# Patient Record
Sex: FEMALE | Race: WHITE
[De-identification: names, ages, dates, MRNs, and addresses within clinical notes are randomized per-mention and may not be internally consistent; named-entity substitution may affect disease eponyms.]

---

## 2018-12-05 ENCOUNTER — HOSPITAL ENCOUNTER (OUTPATIENT)
Dept: HOSPITAL 47 - RADPROMAIN | Age: 63
Discharge: HOME | End: 2018-12-05
Payer: MEDICARE

## 2018-12-05 VITALS — HEART RATE: 77 BPM | SYSTOLIC BLOOD PRESSURE: 132 MMHG | DIASTOLIC BLOOD PRESSURE: 71 MMHG

## 2018-12-05 VITALS — TEMPERATURE: 98 F

## 2018-12-05 VITALS — RESPIRATION RATE: 16 BRPM

## 2018-12-05 DIAGNOSIS — R18.8: Primary | ICD-10-CM

## 2018-12-05 LAB
GLUCOSE SERPL-MCNC: 356 MG/DL (ref 74–99)
INR PPP: 1 (ref ?–1.2)
PLATELET # BLD AUTO: 183 K/UL (ref 150–450)
PT BLD: 10.9 SEC (ref 9–12)

## 2018-12-05 PROCEDURE — 85049 AUTOMATED PLATELET COUNT: CPT

## 2018-12-05 PROCEDURE — 82947 ASSAY GLUCOSE BLOOD QUANT: CPT

## 2018-12-05 PROCEDURE — 85610 PROTHROMBIN TIME: CPT

## 2018-12-05 PROCEDURE — 49083 ABD PARACENTESIS W/IMAGING: CPT

## 2018-12-05 PROCEDURE — 82565 ASSAY OF CREATININE: CPT

## 2018-12-05 NOTE — US
EXAMINATION TYPE: US paracentesis abd w/image

 

DATE OF EXAM: 12/5/2018

 

COMPARISON: NONE

 

HISTORY: Ascites.

 

PROCEDURE:

Maximal barrier technique was utilized.  The skin overlying a suitable pocket of fluid was localized 
with ultrasound and the overlying skin was prepped and draped.  Ultrasound was utilized with sterile 
technique. Lidocaine was used for local anesthesia and a skin nick made with a scalpel. Catheter was 
advanced under direct ultrasound guidance into a suitable pocket of fluid and approximately 3.5 liter
s of serous fluid were removed.  Catheter was withdrawn and hemostasis achieved.  There is no immedia
te complication; the patient is discharged in stable condition. 

 

IMPRESSION:  STATUS POST ULTRASOUND GUIDED PARACENTESIS FOR PALLIATION OF ASCITES.  THIS PROCEDURE WA
S PERFORMED BY THE UNDERSIGNED.